# Patient Record
Sex: MALE | Race: WHITE | NOT HISPANIC OR LATINO | URBAN - METROPOLITAN AREA
[De-identification: names, ages, dates, MRNs, and addresses within clinical notes are randomized per-mention and may not be internally consistent; named-entity substitution may affect disease eponyms.]

---

## 2018-12-26 ENCOUNTER — TRANSCRIBE ORDERS (OUTPATIENT)
Dept: ADMINISTRATIVE | Facility: HOSPITAL | Age: 48
End: 2018-12-26

## 2018-12-26 DIAGNOSIS — N18.2 CHRONIC KIDNEY DISEASE, STAGE II (MILD): ICD-10-CM

## 2018-12-26 DIAGNOSIS — R80.9 PROTEINURIA, UNSPECIFIED TYPE: Primary | ICD-10-CM

## 2018-12-26 DIAGNOSIS — I10 HYPERTENSION, UNSPECIFIED TYPE: ICD-10-CM

## 2019-01-10 ENCOUNTER — HOSPITAL ENCOUNTER (OUTPATIENT)
Dept: CT IMAGING | Facility: HOSPITAL | Age: 49
Discharge: HOME | End: 2019-01-11
Attending: INTERNAL MEDICINE | Admitting: INTERNAL MEDICINE

## 2019-01-10 DIAGNOSIS — N18.2 CHRONIC KIDNEY DISEASE, STAGE II (MILD): ICD-10-CM

## 2019-01-10 DIAGNOSIS — R80.9 PROTEINURIA, UNSPECIFIED TYPE: ICD-10-CM

## 2019-01-10 DIAGNOSIS — I10 HYPERTENSION, UNSPECIFIED TYPE: ICD-10-CM

## 2019-01-10 LAB
ABO GROUP BLD: NORMAL
ABO GROUP BLD: NORMAL
ALBUMIN SERPL-MCNC: 3.94 G/DL (ref 3.2–4.8)
ANION GAP SERPL CALCULATED.3IONS-SCNC: 3 MMOL/L (ref 3–11)
APTT PPP: 31.3 SECONDS (ref 24–31)
BLD GP AB SCN SERPL QL: NEGATIVE
BUN BLD-MCNC: 41 MG/DL (ref 9–23)
BUN/CREAT SERPL: 21.2 (ref 7–25)
CALCIUM SPEC-SCNC: 9.4 MG/DL (ref 8.7–10.4)
CHLORIDE SERPL-SCNC: 112 MMOL/L (ref 99–109)
CLOSURE TME COLL+ADP BLD: 145 SECONDS (ref 54–123)
CLOSURE TME COLL+EPINEP BLD: 209 SECONDS (ref 72–192)
CO2 SERPL-SCNC: 21 MMOL/L (ref 20–31)
CREAT BLD-MCNC: 1.93 MG/DL (ref 0.6–1.3)
DEPRECATED RDW RBC AUTO: 42.9 FL (ref 37–54)
ERYTHROCYTE [DISTWIDTH] IN BLOOD BY AUTOMATED COUNT: 14.9 % (ref 11.3–14.5)
GFR SERPL CREATININE-BSD FRML MDRD: 37 ML/MIN/1.73
GLUCOSE BLD-MCNC: 183 MG/DL (ref 70–100)
GLUCOSE BLDC GLUCOMTR-MCNC: 186 MG/DL (ref 70–130)
GLUCOSE BLDC GLUCOMTR-MCNC: 211 MG/DL (ref 70–130)
HCT VFR BLD AUTO: 36.3 % (ref 38.9–50.9)
HGB BLD-MCNC: 11.2 G/DL (ref 13.1–17.5)
HGB BLD-MCNC: 11.7 G/DL (ref 13.1–17.5)
INR PPP: 1.03 (ref 0.91–1.09)
MCH RBC QN AUTO: 25.3 PG (ref 27–31)
MCHC RBC AUTO-ENTMCNC: 32.2 G/DL (ref 32–36)
MCV RBC AUTO: 78.6 FL (ref 80–99)
PHOSPHATE SERPL-MCNC: 3.9 MG/DL (ref 2.4–5.1)
PLATELET # BLD AUTO: 257 10*3/MM3 (ref 150–450)
PMV BLD AUTO: 11.3 FL (ref 6–12)
POTASSIUM BLD-SCNC: 5.1 MMOL/L (ref 3.5–5.5)
PROTHROMBIN TIME: 10.8 SECONDS (ref 9.6–11.5)
RBC # BLD AUTO: 4.62 10*6/MM3 (ref 4.2–5.76)
RH BLD: POSITIVE
RH BLD: POSITIVE
SODIUM BLD-SCNC: 136 MMOL/L (ref 132–146)
T&S EXPIRATION DATE: NORMAL
WBC NRBC COR # BLD: 7.27 10*3/MM3 (ref 3.5–10.8)

## 2019-01-10 PROCEDURE — 88305 TISSUE EXAM BY PATHOLOGIST: CPT | Performed by: INTERNAL MEDICINE

## 2019-01-10 PROCEDURE — 88350 IMFLUOR EA ADDL 1ANTB STN PX: CPT | Performed by: INTERNAL MEDICINE

## 2019-01-10 PROCEDURE — G0378 HOSPITAL OBSERVATION PER HR: HCPCS

## 2019-01-10 PROCEDURE — 88346 IMFLUOR 1ST 1ANTB STAIN PX: CPT | Performed by: INTERNAL MEDICINE

## 2019-01-10 PROCEDURE — 85576 BLOOD PLATELET AGGREGATION: CPT | Performed by: INTERNAL MEDICINE

## 2019-01-10 PROCEDURE — 80069 RENAL FUNCTION PANEL: CPT | Performed by: INTERNAL MEDICINE

## 2019-01-10 PROCEDURE — 86850 RBC ANTIBODY SCREEN: CPT | Performed by: INTERNAL MEDICINE

## 2019-01-10 PROCEDURE — 86901 BLOOD TYPING SEROLOGIC RH(D): CPT | Performed by: INTERNAL MEDICINE

## 2019-01-10 PROCEDURE — 63710000001 INSULIN REGULAR HUMAN PER 5 UNITS: Performed by: INTERNAL MEDICINE

## 2019-01-10 PROCEDURE — 63710000001 INSULIN DETEMIR PER 5 UNITS: Performed by: INTERNAL MEDICINE

## 2019-01-10 PROCEDURE — 85730 THROMBOPLASTIN TIME PARTIAL: CPT | Performed by: INTERNAL MEDICINE

## 2019-01-10 PROCEDURE — 88348 ELECTRON MICROSCOPY DX: CPT | Performed by: INTERNAL MEDICINE

## 2019-01-10 PROCEDURE — 86900 BLOOD TYPING SEROLOGIC ABO: CPT | Performed by: INTERNAL MEDICINE

## 2019-01-10 PROCEDURE — 82962 GLUCOSE BLOOD TEST: CPT

## 2019-01-10 PROCEDURE — 88313 SPECIAL STAINS GROUP 2: CPT | Performed by: INTERNAL MEDICINE

## 2019-01-10 PROCEDURE — 86900 BLOOD TYPING SEROLOGIC ABO: CPT

## 2019-01-10 PROCEDURE — 86901 BLOOD TYPING SEROLOGIC RH(D): CPT

## 2019-01-10 PROCEDURE — 85018 HEMOGLOBIN: CPT | Performed by: INTERNAL MEDICINE

## 2019-01-10 PROCEDURE — 85027 COMPLETE CBC AUTOMATED: CPT | Performed by: INTERNAL MEDICINE

## 2019-01-10 PROCEDURE — 85610 PROTHROMBIN TIME: CPT | Performed by: INTERNAL MEDICINE

## 2019-01-10 PROCEDURE — 25010000002 PROMETHAZINE PER 50 MG: Performed by: INTERNAL MEDICINE

## 2019-01-10 PROCEDURE — 77012 CT SCAN FOR NEEDLE BIOPSY: CPT

## 2019-01-10 PROCEDURE — 36415 COLL VENOUS BLD VENIPUNCTURE: CPT | Performed by: INTERNAL MEDICINE

## 2019-01-10 RX ORDER — ACETAMINOPHEN 325 MG/1
650 TABLET ORAL EVERY 4 HOURS PRN
Status: DISCONTINUED | OUTPATIENT
Start: 2019-01-10 | End: 2019-01-11 | Stop reason: HOSPADM

## 2019-01-10 RX ORDER — SODIUM CHLORIDE 0.9 % (FLUSH) 0.9 %
3-10 SYRINGE (ML) INJECTION AS NEEDED
Status: DISCONTINUED | OUTPATIENT
Start: 2019-01-10 | End: 2019-01-10

## 2019-01-10 RX ORDER — FENOFIBRATE 160 MG/1
160 TABLET ORAL NIGHTLY
COMMUNITY

## 2019-01-10 RX ORDER — CLONIDINE HYDROCHLORIDE 0.1 MG/1
0.1 TABLET ORAL
Status: DISCONTINUED | OUTPATIENT
Start: 2019-01-10 | End: 2019-01-11 | Stop reason: HOSPADM

## 2019-01-10 RX ORDER — ATORVASTATIN CALCIUM 40 MG/1
40 TABLET, FILM COATED ORAL NIGHTLY
COMMUNITY

## 2019-01-10 RX ORDER — LIDOCAINE HYDROCHLORIDE 10 MG/ML
10 INJECTION, SOLUTION INFILTRATION; PERINEURAL ONCE
Status: COMPLETED | OUTPATIENT
Start: 2019-01-10 | End: 2019-01-10

## 2019-01-10 RX ORDER — CHLORAL HYDRATE 500 MG
2 CAPSULE ORAL NIGHTLY
COMMUNITY

## 2019-01-10 RX ORDER — FEBUXOSTAT 40 MG/1
40 TABLET, FILM COATED ORAL NIGHTLY
Status: DISCONTINUED | OUTPATIENT
Start: 2019-01-10 | End: 2019-01-11 | Stop reason: HOSPADM

## 2019-01-10 RX ORDER — AMLODIPINE BESYLATE 5 MG/1
5 TABLET ORAL DAILY
COMMUNITY

## 2019-01-10 RX ORDER — ATORVASTATIN CALCIUM 40 MG/1
40 TABLET, FILM COATED ORAL NIGHTLY
Status: DISCONTINUED | OUTPATIENT
Start: 2019-01-10 | End: 2019-01-11 | Stop reason: HOSPADM

## 2019-01-10 RX ORDER — HYDROCODONE BITARTRATE AND ACETAMINOPHEN 7.5; 325 MG/1; MG/1
1 TABLET ORAL EVERY 4 HOURS PRN
Status: DISCONTINUED | OUTPATIENT
Start: 2019-01-10 | End: 2019-01-11 | Stop reason: HOSPADM

## 2019-01-10 RX ORDER — AMLODIPINE BESYLATE 5 MG/1
5 TABLET ORAL DAILY
Status: DISCONTINUED | OUTPATIENT
Start: 2019-01-11 | End: 2019-01-11 | Stop reason: HOSPADM

## 2019-01-10 RX ORDER — MEPERIDINE HYDROCHLORIDE 25 MG/ML
25 INJECTION INTRAMUSCULAR; INTRAVENOUS; SUBCUTANEOUS ONCE
Status: COMPLETED | OUTPATIENT
Start: 2019-01-10 | End: 2019-01-10

## 2019-01-10 RX ORDER — LISINOPRIL 5 MG/1
5 TABLET ORAL DAILY
COMMUNITY

## 2019-01-10 RX ORDER — ATROPINE SULFATE 0.4 MG/ML
0.4 AMPUL (ML) INJECTION ONCE
Status: COMPLETED | OUTPATIENT
Start: 2019-01-10 | End: 2019-01-10

## 2019-01-10 RX ORDER — LISINOPRIL 5 MG/1
5 TABLET ORAL DAILY
Status: DISCONTINUED | OUTPATIENT
Start: 2019-01-11 | End: 2019-01-11 | Stop reason: HOSPADM

## 2019-01-10 RX ORDER — ATENOLOL 50 MG/1
50 TABLET ORAL NIGHTLY
COMMUNITY

## 2019-01-10 RX ORDER — FENOFIBRATE 145 MG/1
145 TABLET, COATED ORAL DAILY
Status: DISCONTINUED | OUTPATIENT
Start: 2019-01-10 | End: 2019-01-11 | Stop reason: HOSPADM

## 2019-01-10 RX ORDER — SODIUM CHLORIDE 0.9 % (FLUSH) 0.9 %
3 SYRINGE (ML) INJECTION EVERY 12 HOURS SCHEDULED
Status: DISCONTINUED | OUTPATIENT
Start: 2019-01-10 | End: 2019-01-10

## 2019-01-10 RX ORDER — PROMETHAZINE HYDROCHLORIDE 25 MG/ML
12.5 INJECTION, SOLUTION INTRAMUSCULAR; INTRAVENOUS ONCE
Status: COMPLETED | OUTPATIENT
Start: 2019-01-10 | End: 2019-01-10

## 2019-01-10 RX ORDER — ATENOLOL 50 MG/1
50 TABLET ORAL NIGHTLY
Status: DISCONTINUED | OUTPATIENT
Start: 2019-01-10 | End: 2019-01-11 | Stop reason: HOSPADM

## 2019-01-10 RX ORDER — FEBUXOSTAT 40 MG/1
40 TABLET, FILM COATED ORAL NIGHTLY
COMMUNITY

## 2019-01-10 RX ADMIN — MEPERIDINE HYDROCHLORIDE 25 MG: 25 INJECTION INTRAMUSCULAR; INTRAVENOUS; SUBCUTANEOUS at 12:21

## 2019-01-10 RX ADMIN — LIDOCAINE HYDROCHLORIDE 10 ML: 10 INJECTION, SOLUTION INFILTRATION; PERINEURAL at 12:55

## 2019-01-10 RX ADMIN — ATROPINE SULFATE 0.4 MG: 0.4 INJECTION, SOLUTION INTRAMUSCULAR; INTRAVENOUS; SUBCUTANEOUS at 12:20

## 2019-01-10 RX ADMIN — ATENOLOL 50 MG: 50 TABLET ORAL at 20:27

## 2019-01-10 RX ADMIN — ATORVASTATIN CALCIUM 40 MG: 40 TABLET, FILM COATED ORAL at 20:27

## 2019-01-10 RX ADMIN — INSULIN HUMAN 10 UNITS: 100 INJECTION, SOLUTION PARENTERAL at 17:40

## 2019-01-10 RX ADMIN — FEBUXOSTAT 40 MG: 40 TABLET ORAL at 20:27

## 2019-01-10 RX ADMIN — CHOLECALCIFEROL CAP 125 MCG (5000 UNIT) 5000 UNITS: 125 CAP at 20:27

## 2019-01-10 RX ADMIN — PROMETHAZINE HYDROCHLORIDE 12.5 MG: 25 INJECTION INTRAMUSCULAR; INTRAVENOUS at 12:21

## 2019-01-10 RX ADMIN — INSULIN DETEMIR 45 UNITS: 100 INJECTION, SOLUTION SUBCUTANEOUS at 20:28

## 2019-01-10 NOTE — H&P
NAL Consult Note    Mandy Richardson  1970  [id number]    Date of Admit:  1/10/2019    Date of Consult: 1/10/2019        Requesting Provider: Tristen Lentz, *  Evaluating Physician: Maximo Matamoros MD        Reason for Consultation:  HERE FOR A RENAL BX.History of present illness:    Patient is a 48 y.o.  Yr old male WITH DM, HTN, GOUT AND NEPHROTIC RANGE PROTEINURIA PRESENTS FOR A RENAL BX. CREAT 1.8. P/C RATIO > 10 GRAMS.  ? DM RETINOPATHY.    Past Medical History:   Diagnosis Date   • Diabetes mellitus (CMS/HCC)    • Gout    • Hypertension    • Lower extremity edema    • Proteinuria        Past Surgical History:   Procedure Laterality Date   • COLONOSCOPY      x2       Social History     Socioeconomic History   • Marital status:      Spouse name: Not on file   • Number of children: Not on file   • Years of education: Not on file   • Highest education level: Not on file   Tobacco Use   • Smoking status: Never Smoker   Substance and Sexual Activity   • Alcohol use: No     Frequency: Never   • Drug use: No       family history is not on file.    Allergies   Allergen Reactions   • Penicillins Hives     Can't remember; was a child; possibly broke out in rash/hives       Medication:    Current Facility-Administered Medications:   •  atropine injection 0.4 mg, 0.4 mg, Intramuscular, Once, Maximo Matamoros MD  •  meperidine (DEMEROL) injection 25 mg, 25 mg, Intramuscular, Once, Maximo Matamoros MD  •  promethazine (PHENERGAN) injection 12.5 mg, 12.5 mg, Intramuscular, Once, Maximo Matamoros MD  •  sodium chloride 0.9 % flush 3 mL, 3 mL, Intravenous, Q12H, Maximo Matamoros MD  •  sodium chloride 0.9 % flush 3-10 mL, 3-10 mL, Intravenous, PRN, Maximo Matamoros MD    Medications Prior to Admission   Medication Sig Dispense Refill Last Dose   • amLODIPine (NORVASC) 5 MG tablet Take 5 mg by mouth Daily.   1/10/2019 at 0908   • atenolol (TENORMIN) 50 MG tablet  Take 50 mg by mouth Every Night.   1/9/2019 at Unknown time   • atorvastatin (LIPITOR) 40 MG tablet Take 40 mg by mouth Every Night.   1/9/2019 at Unknown time   • Cholecalciferol (VITAMIN D3) 5000 units capsule capsule Take 5,000 Units by mouth Every Night.   1/9/2019 at Unknown time   • febuxostat (ULORIC) 40 MG tablet Take 40 mg by mouth Every Night.   1/9/2019 at Unknown time   • fenofibrate 160 MG tablet Take 160 mg by mouth Every Night.   1/9/2019 at Unknown time   • Insulin Aspart (FIASP FLEXTOUCH SC) Inject 10 Units under the skin into the appropriate area as directed 3 (Three) Times a Day With Meals.   1/9/2019 at Unknown time   • Insulin Glargine 300 UNIT/ML solution pen-injector Inject 45 Units under the skin into the appropriate area as directed 2 (Two) Times a Day.   1/9/2019 at Unknown time   • lisinopril (PRINIVIL,ZESTRIL) 5 MG tablet Take 5 mg by mouth Daily.   1/10/2019 at 0908   • Omega-3 Fatty Acids (OMEGA-3 FISH OIL) 1000 MG capsule Take 2 capsules by mouth Every Night.   1/9/2019 at Unknown time   • SITagliptin (JANUVIA) 50 MG tablet Take 50 mg by mouth Every Night.   1/9/2019 at Unknown time       Review of Systems:    Constitutional-- No Fever, chills or sweats. No significant change in weight  Eye-- no diplopia, no conjunctivitis  ENT-- No new hearing or throat complaints.  No epistaxis or oral sores. No odynophagia or dysphagia. No headache, photophobia or neck stiffness.  CV-- No chest pain, palpitations, soa, or edema  Resp-- No SOB/cough/Hemoptysis  GI- No nausea, vomiting, or diarrhea.  No hematochezia, melena, or hematemesis.  -- No dysuria, hematuria, or flank pain. No lower tract obstructive symptoms  Skin-- No rash, warm and dry  Lymph- no painful or swollen lymph nodes in neck/axilla or groin.   Heme- No active bruising or bleeding; no Hx of DVT or PE.  MS-- no swelling or pain in the joints  Neuro-- No acute focal weakness or numbness in the arms or legs.  No seizures.  Psych--No  "anxiety or depression  Endo--No cold or heat intolerance.  No polyuria, polydipsia, or polyphagia    Full review of systems reviewed and negative otherwise for acute complaints    Physical Exam:   Vital Signs   Blood pressure 158/81, pulse 74, temperature 97.5 °F (36.4 °C), temperature source Oral, resp. rate 18, height 180.3 cm (71\"), weight 106 kg (233 lb), SpO2 95 %.     GENERAL: Awake and alert, in no acute distress.   HEENT: Normocephalic, atraumatic.  PER.  No conjunctivitis. No icterus. Oropharynx clear without evidence of thrush or exudate. No evidence of peridontal disease.    NECK: Supple without nuchal rigidity. No mass.  LYMPH: No painful cervical, axillary or inguinal lymphadenopathy.  HEART: RRR; No murmur, rubs, gallops. No bruits in neck, abdomen, or groins. TR-1+ edema  LUNGS: Normal respiratory effort. Nonlabored. No dullness.  No crepitus.  Clear to auscultation bilaterally without wheezing, rales, rhonchi.  ABDOMEN: Soft, nontender, nondistended. Positive bowel sounds. No rebound or guarding. NO mass or HSM.  JOINTS:  Full range of motion, no redness or tenderness.  EXT:  No cyanosis, clubbing.   : BLADDER NOT DISTENDED.   SKIN: Warm and dry without rash  NEURO: Oriented to PPT. No focal neurological deficits. Strength equal bilateral  PSYCHIATRIC: Normal insight and judgement. Cooperative with PE    Laboratory Data  Results from last 7 days   Lab Units  01/10/19   0930   HEMOGLOBIN g/dL  11.7*   HEMATOCRIT %  36.3*     Results from last 7 days   Lab Units  01/10/19   0930   SODIUM mmol/L  136   POTASSIUM mmol/L  5.1   CHLORIDE mmol/L  112*   CO2 mmol/L  21.0   BUN mg/dL  41*   CREATININE mg/dL  1.93*   CALCIUM mg/dL  9.4   PHOSPHORUS mg/dL  3.9   ALBUMIN g/dL  3.94     Results from last 7 days   Lab Units  01/10/19   0930   GLUCOSE mg/dL  183*             Estimated Creatinine Clearance: 56.7 mL/min (A) (by C-G formula based on SCr of 1.93 mg/dL (H)).    Radiology:      Impression: STAGE 3 CKD " WITH NEPHROTIC RANGE PROTEINURIA. ? DM NEPHROPATHY VS OTHER.      PLAN: Thank you for asking us to see Mandy Richardson, I recommend the following: EXPLAINED RISKS ( BLEEDING, INFECTION, NEPHRECTOMY AND REMOTE DEATH TO PATIENT WHO UNDER STANDS. WILL PROCEED.        Maximo Matamoros MD  1/10/2019  11:38 AM

## 2019-01-10 NOTE — PLAN OF CARE
Problem: Patient Care Overview  Goal: Plan of Care Review  Outcome: Ongoing (interventions implemented as appropriate)   01/10/19 1649   Coping/Psychosocial   Plan of Care Reviewed With patient   Plan of Care Review   Progress improving   OTHER   Outcome Summary Patient admitted after left renal biopsy. no signs of bleeding. vss        Problem: Surgery Nonspecified (Adult)  Goal: Signs and Symptoms of Listed Potential Problems Will be Absent, Minimized or Managed (Surgery Nonspecified)  Outcome: Ongoing (interventions implemented as appropriate)   01/10/19 1649   Goal/Outcome Evaluation   Problems Assessed (Surgery) all   Problems Present (Surgery) situational response     Goal: Anesthesia/Sedation Recovery  Outcome: Ongoing (interventions implemented as appropriate)   01/10/19 1649   Goal/Outcome Evaluation   Anesthesia/Sedation Recovery recovered to baseline

## 2019-01-10 NOTE — POST-PROCEDURE NOTE
NAL CT Guided Renal Biopsy-Post Procedure Note    Pre-Procedure Diagnosis: PROTEINURIA    Procedure Performed: Kidney Biopsy, CT Guide  Left    Nephrologist: Maximo Matamoros MD    Anesthesia:  Local 1% Lidocaine    Technique: Percutaneous YES    Type of Biopsy needle: MAXI-CORE    Description of Procedure:   After informed consent, time out and site ID, the patient was placed in prone position. The back was prepped and draped in sterile fashion. After appropriate anesthesia the biopsy needle was inserted into kidney with CT guidance. There were 2 passes yielding 2 core(s).    Post Biopsy scan was reviewed. YES. NO HEMATOMA.     Patient tolerated procedure well YES    EBL:   <5mL    Specimens removed: RENAL    Complications: none    Maximo Matamoros MD  01/10/19  1:10 PM

## 2019-01-11 ENCOUNTER — TELEPHONE (OUTPATIENT)
Dept: INFUSION THERAPY | Facility: HOSPITAL | Age: 49
End: 2019-01-11

## 2019-01-11 VITALS
HEIGHT: 71 IN | DIASTOLIC BLOOD PRESSURE: 74 MMHG | RESPIRATION RATE: 16 BRPM | BODY MASS INDEX: 32.62 KG/M2 | SYSTOLIC BLOOD PRESSURE: 124 MMHG | WEIGHT: 233 LBS | TEMPERATURE: 97.5 F | OXYGEN SATURATION: 96 % | HEART RATE: 69 BPM

## 2019-01-11 LAB
ANION GAP SERPL CALCULATED.3IONS-SCNC: 4 MMOL/L (ref 3–11)
BUN BLD-MCNC: 40 MG/DL (ref 9–23)
BUN/CREAT SERPL: 20.8 (ref 7–25)
CALCIUM SPEC-SCNC: 8.7 MG/DL (ref 8.7–10.4)
CHLORIDE SERPL-SCNC: 113 MMOL/L (ref 99–109)
CO2 SERPL-SCNC: 22 MMOL/L (ref 20–31)
CREAT BLD-MCNC: 1.92 MG/DL (ref 0.6–1.3)
DEPRECATED RDW RBC AUTO: 43.6 FL (ref 37–54)
ERYTHROCYTE [DISTWIDTH] IN BLOOD BY AUTOMATED COUNT: 14.8 % (ref 11.3–14.5)
GFR SERPL CREATININE-BSD FRML MDRD: 37 ML/MIN/1.73
GLUCOSE BLD-MCNC: 174 MG/DL (ref 70–100)
GLUCOSE BLDC GLUCOMTR-MCNC: 93 MG/DL (ref 70–130)
HCT VFR BLD AUTO: 35.2 % (ref 38.9–50.9)
HGB BLD-MCNC: 10.7 G/DL (ref 13.1–17.5)
MCH RBC QN AUTO: 24.3 PG (ref 27–31)
MCHC RBC AUTO-ENTMCNC: 30.4 G/DL (ref 32–36)
MCV RBC AUTO: 80 FL (ref 80–99)
PLATELET # BLD AUTO: 274 10*3/MM3 (ref 150–450)
PMV BLD AUTO: 11 FL (ref 6–12)
POTASSIUM BLD-SCNC: 5 MMOL/L (ref 3.5–5.5)
RBC # BLD AUTO: 4.4 10*6/MM3 (ref 4.2–5.76)
SODIUM BLD-SCNC: 139 MMOL/L (ref 132–146)
WBC NRBC COR # BLD: 7.37 10*3/MM3 (ref 3.5–10.8)

## 2019-01-11 PROCEDURE — 82962 GLUCOSE BLOOD TEST: CPT

## 2019-01-11 PROCEDURE — 85027 COMPLETE CBC AUTOMATED: CPT | Performed by: INTERNAL MEDICINE

## 2019-01-11 PROCEDURE — 63710000001 INSULIN DETEMIR PER 5 UNITS: Performed by: INTERNAL MEDICINE

## 2019-01-11 PROCEDURE — 80048 BASIC METABOLIC PNL TOTAL CA: CPT | Performed by: INTERNAL MEDICINE

## 2019-01-11 RX ADMIN — INSULIN DETEMIR 45 UNITS: 100 INJECTION, SOLUTION SUBCUTANEOUS at 08:55

## 2019-01-11 RX ADMIN — FENOFIBRATE 145 MG: 145 TABLET ORAL at 08:56

## 2019-01-11 RX ADMIN — LISINOPRIL 5 MG: 5 TABLET ORAL at 08:56

## 2019-01-11 RX ADMIN — LINAGLIPTIN 5 MG: 5 TABLET, FILM COATED ORAL at 08:56

## 2019-01-11 RX ADMIN — INSULIN HUMAN 10 UNITS: 100 INJECTION, SOLUTION PARENTERAL at 08:56

## 2019-01-11 RX ADMIN — AMLODIPINE BESYLATE 5 MG: 5 TABLET ORAL at 08:56

## 2019-01-11 NOTE — DISCHARGE INSTRUCTIONS
No weight lifting more than 10 pounds for 10 days.  If abdominal pain or gross hematuria go to the nearest ER.  Follow-up in one week with [facility] office in [place].

## 2019-01-11 NOTE — PLAN OF CARE
Problem: Patient Care Overview  Goal: Plan of Care Review  Outcome: Ongoing (interventions implemented as appropriate)   01/11/19 0338   Coping/Psychosocial   Plan of Care Reviewed With patient   Plan of Care Review   Progress improving       Problem: Surgery Nonspecified (Adult)  Goal: Signs and Symptoms of Listed Potential Problems Will be Absent, Minimized or Managed (Surgery Nonspecified)  Outcome: Ongoing (interventions implemented as appropriate)   01/11/19 0338   Goal/Outcome Evaluation   Problems Assessed (Surgery) all   Problems Present (Surgery) situational response

## 2019-01-11 NOTE — PLAN OF CARE
Problem: Surgery Nonspecified (Adult)  Goal: Signs and Symptoms of Listed Potential Problems Will be Absent, Minimized or Managed (Surgery Nonspecified)  Outcome: Ongoing (interventions implemented as appropriate)  Patient

## 2019-01-11 NOTE — DISCHARGE SUMMARY
Please see today's note for discharge summary.  Patient going home postbiopsy no complications patient is asymptomatic hemoglobin and hematocrit stable.

## 2019-01-11 NOTE — PROGRESS NOTES
"   LOS: 0 days    Patient Care Team:  Vivien Pearson APRN as PCP - General (Nurse Practitioner)    Chief Complaint: It is post Kidney biopsy    Subjective     Interval History:   48-year-old  male admitted for kidney biopsy for proteinuria.  History of diabetes, hypertension, gout.  Review of Systems:   Denies nausea vomiting abdominal pain hematuria.  No dysuria    Objective     Vital Sign Min/Max for last 24 hours  Temp  Min: 97.5 °F (36.4 °C)  Max: 97.9 °F (36.6 °C)   BP  Min: 116/70  Max: 169/93   Pulse  Min: 69  Max: 83   Resp  Min: 16  Max: 20   SpO2  Min: 96 %  Max: 99 %   No Data Recorded   Weight  Min: 106 kg (233 lb)  Max: 108 kg (238 lb 3.2 oz)     Flowsheet Rows      First Filed Value   Admission Height  180.3 cm (71\") Documented at 01/10/2019 0845   Admission Weight  106 kg (233 lb) Documented at 01/10/2019 0845          No intake/output data recorded.  I/O last 3 completed shifts:  In: 640 [P.O.:640]  Out: -     Physical Exam:  General appearance: Comfortable in bed no obvious distress alert and oriented ×3  Neck: Supple no JVD.  Lungs: Clear auscultation no rales or rhonchi's.  Heart: No gallop murmur or rub.  Abdomen: Soft nontender positive bowel sounds.  Extremities: Trace edema.  Neurological alert oriented ×3 no focal deficit.    WBC WBC   Date Value Ref Range Status   01/11/2019 7.37 3.50 - 10.80 10*3/mm3 Final   01/10/2019 7.27 3.50 - 10.80 10*3/mm3 Final      HGB Hemoglobin   Date Value Ref Range Status   01/11/2019 10.7 (L) 13.1 - 17.5 g/dL Final   01/10/2019 11.2 (L) 13.1 - 17.5 g/dL Final   01/10/2019 11.7 (L) 13.1 - 17.5 g/dL Final      HCT Hematocrit   Date Value Ref Range Status   01/11/2019 35.2 (L) 38.9 - 50.9 % Final   01/10/2019 36.3 (L) 38.9 - 50.9 % Final      Platlets No results found for: LABPLAT   MCV MCV   Date Value Ref Range Status   01/11/2019 80.0 80.0 - 99.0 fL Final   01/10/2019 78.6 (L) 80.0 - 99.0 fL Final          Sodium Sodium   Date Value Ref Range " Status   01/11/2019 139 132 - 146 mmol/L Final   01/10/2019 136 132 - 146 mmol/L Final      Potassium Potassium   Date Value Ref Range Status   01/11/2019 5.0 3.5 - 5.5 mmol/L Final   01/10/2019 5.1 3.5 - 5.5 mmol/L Final      Chloride Chloride   Date Value Ref Range Status   01/11/2019 113 (H) 99 - 109 mmol/L Final   01/10/2019 112 (H) 99 - 109 mmol/L Final      CO2 CO2   Date Value Ref Range Status   01/11/2019 22.0 20.0 - 31.0 mmol/L Final   01/10/2019 21.0 20.0 - 31.0 mmol/L Final      BUN BUN   Date Value Ref Range Status   01/11/2019 40 (H) 9 - 23 mg/dL Final   01/10/2019 41 (H) 9 - 23 mg/dL Final      Creatinine Creatinine   Date Value Ref Range Status   01/11/2019 1.92 (H) 0.60 - 1.30 mg/dL Final   01/10/2019 1.93 (H) 0.60 - 1.30 mg/dL Final      Calcium Calcium   Date Value Ref Range Status   01/11/2019 8.7 8.7 - 10.4 mg/dL Final   01/10/2019 9.4 8.7 - 10.4 mg/dL Final      PO4 No results found for: CAPO4   Albumin Albumin   Date Value Ref Range Status   01/10/2019 3.94 3.20 - 4.80 g/dL Final      Magnesium No results found for: MG   Uric Acid No results found for: URICACID        Results Review:     I reviewed the patient's new clinical results.      amLODIPine 5 mg Oral Daily   atenolol 50 mg Oral Nightly   atorvastatin 40 mg Oral Nightly   febuxostat 40 mg Oral Nightly   fenofibrate 145 mg Oral Daily   insulin detemir 45 Units Subcutaneous Q12H   insulin regular 10 Units Subcutaneous TID With Meals   linagliptin 5 mg Oral Daily   lisinopril 5 mg Oral Daily   vitamin D3 5,000 Units Oral Nightly          Medication Review: As above    Assessment/Plan       Proteinuria  Plan going home.  Follow-up outpatient clinic in 1 week's time.  No weight lifting more than 10 pounds for 10 days  No blood thinners for 4 days.  If abnormal pain or gross hematuria go to the nearest emergency room.    Plan for disposition:Where: home and current living arrangements    Rebecca Becerril MD  01/11/19  9:14 AM

## 2019-01-18 LAB
LAB AP CASE REPORT: NORMAL
PATH REPORT.FINAL DX SPEC: NORMAL